# Patient Record
Sex: FEMALE | Race: WHITE | URBAN - NONMETROPOLITAN AREA
[De-identification: names, ages, dates, MRNs, and addresses within clinical notes are randomized per-mention and may not be internally consistent; named-entity substitution may affect disease eponyms.]

---

## 2017-12-24 ENCOUNTER — OFFICE VISIT - GICH (OUTPATIENT)
Dept: FAMILY MEDICINE | Facility: OTHER | Age: 37
End: 2017-12-24

## 2017-12-24 ENCOUNTER — HISTORY (OUTPATIENT)
Dept: FAMILY MEDICINE | Facility: OTHER | Age: 37
End: 2017-12-24

## 2017-12-24 DIAGNOSIS — J32.9 CHRONIC SINUSITIS: ICD-10-CM

## 2017-12-24 DIAGNOSIS — B97.89 OTHER VIRAL AGENTS AS THE CAUSE OF DISEASES CLASSIFIED ELSEWHERE: ICD-10-CM

## 2018-02-09 VITALS
SYSTOLIC BLOOD PRESSURE: 118 MMHG | TEMPERATURE: 98.5 F | WEIGHT: 200.6 LBS | HEIGHT: 72 IN | HEART RATE: 96 BPM | BODY MASS INDEX: 27.17 KG/M2 | RESPIRATION RATE: 22 BRPM | OXYGEN SATURATION: 98 % | DIASTOLIC BLOOD PRESSURE: 76 MMHG

## 2018-02-12 NOTE — PROGRESS NOTES
Patient Information     Patient Name MRN Sex Jennifer Rosas [id number] Female 1980      Progress Notes by Marie Pratt NP at 2017 10:30 AM     Author:  Marie Pratt NP Service:  (none) Author Type:  PHYS- Nurse Practitioner     Filed:  2017 11:09 AM Encounter Date:  2017 Status:  Signed     :  Marie Pratt NP (PHYS- Nurse Practitioner)            Nursing Notes:   Nelly Campbell  2017 11:03 AM  Signed  Patient presents to the clinic for possible sinus infection. S/sx started about a week ago and include sinus pressure, ear popping, sinus headaches. Has been afebrile.   Nelly Campbell LPN............................ 2017 10:26 AM    SUBJECTIVE:    Jennifer Trivedi is a 37 y.o. female who presents for uri s/s    URI    This is a new problem. Episode onset: 10 days. The problem has been unchanged. There has been no fever. Associated symptoms include congestion, headaches, a plugged ear sensation, rhinorrhea, sinus pain, sneezing and a sore throat. Pertinent negatives include no coughing, ear pain, swollen glands, vomiting or wheezing. She has tried increased fluids for the symptoms. The treatment provided no relief.       No current outpatient prescriptions on file prior to visit.     No current facility-administered medications on file prior to visit.        REVIEW OF SYSTEMS:  Review of Systems   HENT: Positive for congestion, rhinorrhea, sinus pain, sneezing and sore throat. Negative for ear pain.    Respiratory: Negative for cough and wheezing.    Gastrointestinal: Negative for vomiting.   Neurological: Positive for headaches.       OBJECTIVE:  /76 (Cuff Site: Left Arm, Position: Sitting, Cuff Size: Adult Regular)  Pulse 96  Temp 98.5  F (36.9  C) (Tympanic)  Resp 22  Ht 1.829 m (6')  Wt 91 kg (200 lb 9.6 oz)  SpO2 98%  Breastfeeding? No  BMI 27.21 kg/m2    EXAM:   Physical Exam   Constitutional: She is well-developed,  well-nourished, and in no distress.   HENT:   Head: Normocephalic and atraumatic.   Right Ear: Tympanic membrane and ear canal normal.   Left Ear: Tympanic membrane and ear canal normal.   Nose: Mucosal edema present. Right sinus exhibits maxillary sinus tenderness and frontal sinus tenderness. Left sinus exhibits maxillary sinus tenderness and frontal sinus tenderness.   Mouth/Throat: Uvula is midline, oropharynx is clear and moist and mucous membranes are normal.   Eyes: Conjunctivae are normal.   Neck: Neck supple.   Cardiovascular: Normal rate, regular rhythm and normal heart sounds.    Pulmonary/Chest: Effort normal and breath sounds normal. No accessory muscle usage. No respiratory distress. She has no decreased breath sounds. She has no wheezes. She has no rhonchi. She has no rales.   No cough noted   Lymphadenopathy:     She has no cervical adenopathy.   Skin: Skin is warm and dry.   Psychiatric: Mood and affect normal.   Nursing note and vitals reviewed.      ASSESSMENT/PLAN:    ICD-10-CM    1. Viral sinusitis J32.9      B97.89         Plan:  S/S only for 10 days. Likely still viral in nature. Give it another week then see PCP if not slowly improving. Home cares and OTC discussed in detail including dayquil and sudafed.   Symptoms likely due to virus. No antibiotic is needed at this time. Symptoms typically worse on days 2-5 and then stabilize and you are sick for days 5-12. Days 12-14 there is slow resolution and if there is a cough, studies show it can linger longer, however one is not as ill as in the beginning. If symptoms begin worsening or fail to improve after 14 days, return to clinic for reevaluation. All questions were answered and she is in agreement with plan.      GERSON ZAMARRIPA NP ....................  12/24/2017   11:09 AM

## 2018-02-12 NOTE — PATIENT INSTRUCTIONS
Patient Information     Patient Name MRN Jennifer Sung [id number] Female 1980      Patient Instructions by Marie Pratt NP at 2017 10:30 AM     Author:  Marie Pratt NP Service:  (none) Author Type:  PHYS- Nurse Practitioner     Filed:  2017 11:04 AM Encounter Date:  2017 Status:  Signed     :  Marie Pratt NP (PHYS- Nurse Practitioner)            Cold Medicines   What are cold medicines?  Symptoms of the common cold start gradually over several days and usually last about two weeks. Symptoms may include sneezing, a stuffy or runny nose, sore throat, cough, watery eyes, mild headache, or body aches. A cold will go away on its own without treatment. However, there are many nonprescription products that may help relieve some of the symptoms of a cold. Cold medicines often contain more than one ingredient and are used to treat more than one symptom. Read the labels and buy products that have only the ingredients that you need. If you are not sure which medicine is best, ask your pharmacist.  How do they work?  Decongestants reduce swelling in your nose and sinuses. They may also lessen the amount of mucus made by your nose. If you use decongestants more often than directed, your stuffy nose may get worse.   Antihistamines block the effect of histamine. Histamine is a chemical your body makes when you have an allergic reaction. Antihistamines are most often used to treat itchy or watery eyes or a stuffy or runny nose caused by an allergy. Antihistamines may not help a stuffy or runny nose caused by a cold because they can make mucus thick and dry.  Mucolytics are medicines that make mucus thinner so that it is easier to cough up out of your throat and lungs.  Expectorants are cough medicines that may help to keep the mucus thin and bring up mucus from the lungs when you cough. This may relieve chest congestion and make it easier to breathe.   Cough  suppressants (antitussives) are medicines that lessen the urge to cough. They may give relief from a dry, hacking cough. If you have a cough that is wet sounding and produces mucus, it is important for you to cough the mucus up out of your lungs. For this reason, cough suppressants are not recommended for a wet sounding cough.  Fever and pain relievers, such as acetaminophen, aspirin, or other nonsteroidal anti-inflammatory drugs (NSAIDs), are often included in cold medicine. Read labels carefully to avoid taking more medicine than you need.  What else do I need to know about this medicine?    Talk to your healthcare provider if your symptoms start suddenly or you have severe symptoms. This may mean you have something more serious than a cold.    Follow the directions that come with your medicine, including information about food or alcohol. Make sure you know how and when to take your medicine. Do not take more or less than you are supposed to take.    Try to get all of your medicine at the same place. Your pharmacist can help make sure that all of your medicines are safe to take together.    Keep a list of your medicines with you. List all of the prescription medicines, nonprescription medicines, supplements, natural remedies, and vitamins that you take. Tell all healthcare providers who treat you about all of the products you are taking.    Many medicines have side effects. A side effect is a symptom or problem that is caused by the medicine. Ask your healthcare provider or pharmacist what side effects the medicine may cause and what you should do if you have side effects.    Nonsteroidal anti-inflammatory medicines (NSAIDs), such as ibuprofen, naproxen, and aspirin, may cause stomach bleeding and other problems. These risks increase with age. Read the label and take as directed. Unless recommended by your healthcare provider, do not take for more than 10 days for any reason.    Acetaminophen may cause liver  damage or other problems. Unless recommended by your provider, don't take more than 3000 milligrams (mg) in 24 hours. To make sure you don t take too much, check other medicines you take to see if they also contain acetaminophen. Ask your provider if you need to avoid drinking alcohol while taking this medicine.  If you have any questions, ask your healthcare provider or pharmacist for more information. Be sure to keep all appointments for provider visits or tests.      Symptoms likely due to virus. No antibiotic is needed at this time. Symptoms typically worse on days 2-5 and then stabilize and you are sick for days 5-12. Days 12-14 there is slow resolution and if there is a cough, studies show it can linger longer, however one is not as ill as in the beginning. If symptoms begin worsening or fail to improve after 14 days, return to clinic for reevaluation.

## 2018-02-12 NOTE — NURSING NOTE
Patient Information     Patient Name MRN Jennifer Sung [id number] Female 1980      Nursing Note by Nelly Campbell at 2017 10:30 AM     Author:  Nelly Campbell Service:  (none) Author Type:  NURS- Student Practical Nurse     Filed:  2017 11:03 AM Encounter Date:  2017 Status:  Signed     :  Nelly Campbell (NURS- Student Practical Nurse)            Patient presents to the clinic for possible sinus infection. S/sx started about a week ago and include sinus pressure, ear popping, sinus headaches. Has been afebrile.   Nelly Campbell LPN............................ 2017 10:26 AM